# Patient Record
Sex: FEMALE | Race: WHITE | Employment: UNEMPLOYED | URBAN - METROPOLITAN AREA
[De-identification: names, ages, dates, MRNs, and addresses within clinical notes are randomized per-mention and may not be internally consistent; named-entity substitution may affect disease eponyms.]

---

## 2020-07-23 ENCOUNTER — HOSPITAL ENCOUNTER (EMERGENCY)
Age: 25
Discharge: HOME | End: 2020-07-23

## 2020-07-23 VITALS
HEIGHT: 61 IN | DIASTOLIC BLOOD PRESSURE: 63 MMHG | OXYGEN SATURATION: 95 % | HEART RATE: 101 BPM | BODY MASS INDEX: 16.99 KG/M2 | SYSTOLIC BLOOD PRESSURE: 95 MMHG | TEMPERATURE: 98 F | WEIGHT: 90 LBS | RESPIRATION RATE: 14 BRPM

## 2020-07-23 PROCEDURE — 99283 EMERGENCY DEPT VISIT LOW MDM: CPT

## 2020-07-23 PROCEDURE — 93005 ELECTROCARDIOGRAM TRACING: CPT | Performed by: EMERGENCY MEDICINE

## 2020-07-23 RX ORDER — NALOXONE HYDROCHLORIDE 4 MG/.1ML
SPRAY NASAL
Status: DISCONTINUED
Start: 2020-07-23 | End: 2020-07-23 | Stop reason: HOSPADM

## 2020-07-23 NOTE — ED PROVIDER NOTES
[address] [facility]        Pt Name: Octavio Hearn  MRN: [id number]  [place] 1995  Date of evaluation: 7/23/2020  Provider: POP Pryor CNP  PCP: No primary care provider on file. Evaluation by LASHELL. My supervising physician was available for consultation. Dr. Soniya Mclaughlin       Chief Complaint   Patient presents with    Drug Overdose     Pt arrived via [facility] squad from home after overdosing on heroin. CPR in progress upon arrival. Pt had to be bagged. 2 IN narcan en route. Pt A&O x3 in triage. HISTORY OF PRESENT ILLNESS   (Location, Timing/Onset, Context/Setting, Quality, Duration, Modifying Factors, Severity, Associated Signs and Symptoms)  Note limiting factors. Octavio Hearn is a 31 y.o. female medical history of opioid abuse, IV drug abuse who presents the ED with complaints of drug overdose that have occurred prior to arrival.  Patient says she last remembers being in her mother's room with a \"rock of fentanyl \"that she then proceeded to inject intravenously. She then remembers arriving at the hospital.  Per EMS she was given Narcan in route and had CPR performed on her. Patient said that she has overdosed once before. Says she has never gone through drug detox for treatment. Says that she smokes 1 pack/day, denies any alcohol use or additional drug use. Said that she has not had a menstrual cycle in approximately 2 years. She denies any associated headache, neck pain, nausea, vomiting, diarrhea, loss of bowel or bladder function, or leg swelling. Said that she just feels tired and wants to sleep. Nursing Notes were all reviewed and agreed with or any disagreements were addressed in the HPI. REVIEW OF SYSTEMS    (2-9 systems for level 4, 10 or more for level 5)     Review of Systems    Positives and Pertinent negatives as per HPI.   Except as noted above in the ROS, all other systems were reviewed and negative. PAST MEDICAL HISTORY   History reviewed. No pertinent past medical history. SURGICAL HISTORY   History reviewed. No pertinent surgical history. CURRENTMEDICATIONS       Previous Medications    No medications on file         ALLERGIES     Patient has no known allergies. FAMILYHISTORY     History reviewed. No pertinent family history. SOCIAL HISTORY       Social History     Tobacco Use    Smoking status: Current Every Day Smoker     Packs/day: 1.00     Types: Cigarettes    Smokeless tobacco: Never Used   Substance Use Topics    Alcohol use: Not Currently    Drug use: Yes     Types: IV       SCREENINGS             PHYSICAL EXAM    (up to 7 for level 4, 8 or more for level 5)     ED Triage Vitals [07/23/20 1702]   Enc Vitals Group      /78      Pulse 100      Resp 14      Temp 98 °F (36.7 °C)      Temp Source Oral      SpO2 99 %      Weight 90 lb (40.8 kg)      Height 5' 1\" (1.549 m)       Physical Exam  Vitals signs and nursing note reviewed. Constitutional:       General: She is sleeping. Appearance: Normal appearance. She is well-developed and normal weight. HENT:      Head: Normocephalic and atraumatic. Nose: Nose normal.   Eyes:      General:         Right eye: No discharge. Left eye: No discharge. Extraocular Movements: Extraocular movements intact. Pupils: Pupils are equal, round, and reactive to light. Neck:      Musculoskeletal: Normal range of motion. Cardiovascular:      Rate and Rhythm: Normal rate and regular rhythm. Heart sounds: Normal heart sounds. Pulmonary:      Effort: Pulmonary effort is normal. No respiratory distress. Breath sounds: Normal breath sounds. Abdominal:      General: Bowel sounds are normal.      Palpations: Abdomen is soft. Tenderness: There is no abdominal tenderness. Musculoskeletal: Normal range of motion.    Skin:     General: Skin is warm and dry. Coloration: Skin is not pale. Neurological:      Mental Status: She is oriented to person, place, and time. Psychiatric:         Behavior: Behavior normal.         DIAGNOSTIC RESULTS   LABS:    Labs Reviewed - No data to display    All other labs were within normal range or not returned as of this dictation. EKG: All EKG's are interpreted by the Emergency Department Physician in the absence of a cardiologist.  Please see their note for interpretation of EKG. RADIOLOGY:   Non-plain film images such as CT, Ultrasound and MRI are read by the radiologist. Plain radiographic images are visualized and preliminarily interpreted by the ED Provider with the below findings:        Interpretation per the Radiologist below, if available at the time of this note:    No orders to display     No results found. PROCEDURES   Unless otherwise noted below, none     Procedures    CRITICAL CARE TIME   N/A    CONSULTS:  None      EMERGENCY DEPARTMENT COURSE and DIFFERENTIAL DIAGNOSIS/MDM:   Vitals:    Vitals:    07/23/20 1830 07/23/20 1900 07/23/20 1930 07/23/20 2000   BP: 91/65 92/66 93/62 95/63   Pulse:  101     Resp:       Temp:       TempSrc:       SpO2: 95% 95% 95% 95%   Weight:       Height:           Patient was given the following medications:  Medications - No data to display        Pertinent Labs & Imaging studies reviewed. (See chart for details)   -  Patient seen and evaluated in the emergency department. -  Triage and nursing notes reviewed and incorporated. -  Old chart records reviewed and incorporated. -  Patient case was not discussed with attending physician,  although Dr. Christina White was available for consultation  -  Differential diagnosis includes:  Cardiac arrest, respiratory arrest, verse COVID-19  -  Work-up included:  See above EKG  -  ED treatment included:  None  - Consults: None  -  Results discussed with patient. Patient woke up says she was thirsty.   She ambulated in the hallway without difficulty. Instructed on home care and follow-up with PCP for further evaluation. Patient was given a list of detox centers and a Narcan kit to go. Pt was given strict return precautions. The patient is agreeable with plan of care and disposition.  -  Disposition:    Home    FINAL IMPRESSION      1. Opiate overdose, accidental or unintentional, initial encounter ([facility] [address]    2.  IV drug abuse [facility])          DISPOSITION/PLAN   DISPOSITION        PATIENT REFERREDTO:  Coco Munoz  [phone]  Call in 2 days  As needed, If symptoms worsen    [facility] Emergency Department  [address] [facility]  [phone]  Go to   As needed      DISCHARGE MEDICATIONS:  New Prescriptions    NALOXONE HCL (NALOXONE OPIATE OVERDOSE KIT)    1 each by Nasal route once for 1 dose       DISCONTINUED MEDICATIONS:  Discontinued Medications    No medications on file              (Please note that portions of this note were completed with a voice recognition program.  Efforts were made to edit the dictations but occasionally words are mis-transcribed.)    POP Lawrence CNP (electronically signed)            POP Lawrence CNP  07/25/20 7633

## 2020-07-24 LAB
EKG ATRIAL RATE: 111 BPM
EKG DIAGNOSIS: NORMAL
EKG P AXIS: 68 DEGREES
EKG P-R INTERVAL: 150 MS
EKG Q-T INTERVAL: 310 MS
EKG QRS DURATION: 70 MS
EKG QTC CALCULATION (BAZETT): 421 MS
EKG R AXIS: 57 DEGREES
EKG T AXIS: 48 DEGREES
EKG VENTRICULAR RATE: 111 BPM

## 2020-07-24 PROCEDURE — 93010 ELECTROCARDIOGRAM REPORT: CPT | Performed by: INTERNAL MEDICINE

## 2020-07-24 NOTE — ED NOTES
Pt given Narcan to take home. Sent to waiting room to wait for ride.      Cierra Bolden, ESTEVAN  07/23/20 2055

## 2020-07-24 NOTE — ED NOTES
Pt Discharged in stable condition, VSS, no signs of distress, discharge instructions and meds reviewed. Pt verbalizes understanding and states no further questions or concerns unaddressed.        Ezekiel Schlatter, RN  07/23/20 2055

## 2020-07-24 NOTE — ED NOTES
Pt ambulated in hallway, gait steady and even, denies dizziness, reports feeling well.      Erica Faith RN  07/23/20 2031

## 2022-05-04 ENCOUNTER — HOSPITAL ENCOUNTER (EMERGENCY)
Age: 27
Discharge: LEFT BEFORE DISCHARGE | End: 2022-05-04

## 2022-05-04 VITALS
DIASTOLIC BLOOD PRESSURE: 74 MMHG | OXYGEN SATURATION: 97 % | TEMPERATURE: 98.5 F | RESPIRATION RATE: 16 BRPM | SYSTOLIC BLOOD PRESSURE: 97 MMHG | HEART RATE: 96 BPM | BODY MASS INDEX: 24.56 KG/M2 | WEIGHT: 130 LBS

## 2022-05-04 ASSESSMENT — PAIN - FUNCTIONAL ASSESSMENT: PAIN_FUNCTIONAL_ASSESSMENT: 0-10

## 2022-05-04 ASSESSMENT — PAIN SCALES - GENERAL: PAINLEVEL_OUTOF10: 8

## 2022-05-04 NOTE — ED NOTES
Patient has decided to leave without treatment. She spoke with Richard Shock from discharge planning and received various info regarding her current complaints and treatments. Patient has decided to leave prior to receiving medical screening exam.  Attempts made to convince patient to stay and receive medical screening exam were unsuccessful. Patient advised of possible risks of leaving prior to receiving medical screening exam.  Patient voices understanding.          Nura Ron RN  05/04/22 6867

## 2022-05-04 NOTE — CARE COORDINATION
Referred to patient for assistance with medication and MD.  Patient with HIV. Patient accompied by sister. Patient recently moved in with sister from [facility]. Patient was at [facility] for rehab for 8 days. Patient denies need for further treatment at this time. Patient unable to state if she has medicaid. Thinks she had [employer] at one time. Registration also unable to verify medicaid. Patient/sister provided with numbers for medicaid for [facility] and [facility]. Patient states she was seeing MD at [facility]. States Enrrique Crowell has the information. Patient/sister provided with number for Enrrique Crowell and  ID clinic who treats HIV patients. Patient also provided with information on [employer] for assistance. Patient given [facility] also. Patient/sister deny other needs at this time. Decided they do not want to wait for treatment and left. RN updated.   Electronically signed by JEAN Martinez on 5/4/2022 at 4:43 PM Patient/Caregiver provided printed discharge information.